# Patient Record
Sex: MALE | Race: WHITE | NOT HISPANIC OR LATINO | URBAN - METROPOLITAN AREA
[De-identification: names, ages, dates, MRNs, and addresses within clinical notes are randomized per-mention and may not be internally consistent; named-entity substitution may affect disease eponyms.]

---

## 2023-02-04 ENCOUNTER — HOSPITAL ENCOUNTER (EMERGENCY)
Facility: HOSPITAL | Age: 43
Discharge: HOME | End: 2023-02-04
Attending: EMERGENCY MEDICINE

## 2023-02-04 VITALS
DIASTOLIC BLOOD PRESSURE: 87 MMHG | SYSTOLIC BLOOD PRESSURE: 139 MMHG | RESPIRATION RATE: 19 BRPM | TEMPERATURE: 98 F | HEART RATE: 111 BPM | OXYGEN SATURATION: 94 %

## 2023-02-04 DIAGNOSIS — T50.901A ACCIDENTAL OVERDOSE, INITIAL ENCOUNTER: Primary | ICD-10-CM
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
## 2023-02-04 LAB
ATRIAL RATE: 87 BPM
P AXIS: 66 DEGREES
PR INTERVAL: 140 MS
QRS AXIS: 58 DEGREES
QRSD INTERVAL: 102 MS
QT INTERVAL: 360 MS
QTC INTERVAL: 433 MS
T WAVE AXIS: 60 DEGREES
VENTRICULAR RATE: 87 BPM

## 2023-02-04 RX ORDER — NALOXONE HYDROCHLORIDE 1 MG/ML
1 INJECTION PARENTERAL ONCE
Status: COMPLETED | OUTPATIENT
Start: 2023-02-04 | End: 2023-02-04

## 2023-02-05 NOTE — DISCHARGE INSTRUCTIONS
Return to the ED if you develop severe back, neck, chest or abdominal pain AND/OR you develop persistent headaches, dizziness or vomiting
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
